# Patient Record
Sex: MALE | Employment: FULL TIME | URBAN - METROPOLITAN AREA
[De-identification: names, ages, dates, MRNs, and addresses within clinical notes are randomized per-mention and may not be internally consistent; named-entity substitution may affect disease eponyms.]

---

## 2017-09-25 ENCOUNTER — APPOINTMENT (OUTPATIENT)
Dept: GENERAL RADIOLOGY | Age: 22
End: 2017-09-25
Attending: EMERGENCY MEDICINE
Payer: SELF-PAY

## 2017-09-25 ENCOUNTER — HOSPITAL ENCOUNTER (EMERGENCY)
Age: 22
Discharge: HOME | End: 2017-09-25
Attending: EMERGENCY MEDICINE
Payer: SELF-PAY

## 2017-09-25 VITALS
TEMPERATURE: 98.3 F | DIASTOLIC BLOOD PRESSURE: 57 MMHG | WEIGHT: 175 LBS | RESPIRATION RATE: 16 BRPM | BODY MASS INDEX: 21.76 KG/M2 | OXYGEN SATURATION: 100 % | HEART RATE: 85 BPM | SYSTOLIC BLOOD PRESSURE: 119 MMHG | HEIGHT: 75 IN

## 2017-09-25 DIAGNOSIS — S52.042A CLOSED DISPLACED FRACTURE OF CORONOID PROCESS OF LEFT ULNA, INITIAL ENCOUNTER: Primary | ICD-10-CM

## 2017-09-25 PROCEDURE — L3670 SO ACRO/CLAV CAN WEB PRE OTS: HCPCS

## 2017-09-25 PROCEDURE — 73080 X-RAY EXAM OF ELBOW: CPT

## 2017-09-25 PROCEDURE — 75810000053 HC SPLINT APPLICATION

## 2017-09-25 PROCEDURE — 99283 EMERGENCY DEPT VISIT LOW MDM: CPT

## 2017-09-25 RX ORDER — TRAMADOL HYDROCHLORIDE 50 MG/1
50 TABLET ORAL
Qty: 20 TAB | Refills: 0 | Status: SHIPPED | OUTPATIENT
Start: 2017-09-25

## 2017-09-25 NOTE — DISCHARGE INSTRUCTIONS
Broken Elbow: Care Instructions  Your Care Instructions  A fractured elbow means that a bone has broken in or near the joint. Broken bones (fractures) can range from a small, hairline crack, to a bone or bones broken into two or more pieces. Your treatment depends on how bad the break is. Your doctor may have put your arm in a cast or splint to allow your elbow to heal or to keep it stable until you see another doctor. You also may wear a sling to help support your arm. It may take weeks or months for your elbow to heal. You can help it heal with some care at home. You heal best when you take good care of yourself. Eat a variety of healthy foods, and don't smoke. You may have had a sedative to help you relax. You may be unsteady after having sedation. It can take a few hours for the medicine's effects to wear off. Common side effects of sedation include nausea, vomiting, and feeling sleepy or tired. The doctor has checked you carefully, but problems can develop later. If you notice any problems or new symptoms, get medical treatment right away. Follow-up care is a key part of your treatment and safety. Be sure to make and go to all appointments, and call your doctor if you are having problems. It's also a good idea to know your test results and keep a list of the medicines you take. How can you care for yourself at home? · If the doctor gave you a sedative:  ¨ For 24 hours, don't do anything that requires attention to detail. It takes time for the medicine's effects to completely wear off. ¨ For your safety, do not drive or operate any machinery that could be dangerous. Wait until the medicine wears off and you can think clearly and react easily. · Put ice or a cold pack on your arm for 10 to 20 minutes at a time. Try to do this every 1 to 2 hours for the next 3 days (when you are awake). Put a thin cloth between the ice and your cast or splint. Keep your cast or splint dry.   · Follow the cast care instructions your doctor gives you. If you have a splint, do not take it off unless your doctor tells you to. · Be safe with medicines. Take pain medicines exactly as directed. ¨ If the doctor gave you a prescription medicine for pain, take it as prescribed. ¨ If you are not taking a prescription pain medicine, ask your doctor if you can take an over-the-counter medicine. · Prop up your arm on pillows when you sit or lie down in the first few days after the injury. Keep your elbow higher than the level of your heart. This will help reduce swelling. · Follow instructions for exercises to keep your arm strong. · Wiggle your fingers and wrist often to reduce swelling and stiffness. When should you call for help? Call 911 anytime you think you may need emergency care. For example, call if:  · You have trouble breathing. · You passed out (lost consciousness). Call your doctor now or seek immediate medical care if:  · You have new or worse nausea or vomiting. · You have increased or severe pain. · Your hand is cool or pale or changes color. · You have tingling, weakness, or numbness in your hand or fingers. · Your cast or splint feels too tight. · You cannot move your fingers or have trouble moving your fingers. · The skin under your cast or splint is burning or stinging. Watch closely for changes in your health, and be sure to contact your doctor if:  · You do not get better as expected. Where can you learn more? Go to http://montez-chad.info/. Enter E293 in the search box to learn more about \"Broken Elbow: Care Instructions. \"  Current as of: March 21, 2017  Content Version: 11.3  © 8161-8772 [employer]. Care instructions adapted under license by [employer] (which disclaims liability or warranty for this information).  If you have questions about a medical condition or this instruction, always ask your healthcare professional. Deandre Carrion disclaims any warranty or liability for your use of this information.

## 2017-09-25 NOTE — ED PROVIDER NOTES
Patient is a 19 y.o. male presenting with elbow pain. The history is provided by the patient. Elbow Pain      Vincenzo Hudson is a 19 y.o. male presents with left elbow pain. Was playing football yesterday and fell on arm. Denies previous elbow injury. History reviewed. No pertinent past medical history. Past Surgical History:   Procedure Laterality Date    HX WRIST FRACTURE TX           History reviewed. No pertinent family history. Social History     Social History    Marital status: SINGLE     Spouse name: N/A    Number of children: N/A    Years of education: N/A     Occupational History    Not on file. Social History Main Topics    Smoking status: Current Every Day Smoker     Packs/day: 0.50    Smokeless tobacco: Not on file    Alcohol use Yes      Comment: rarely    Drug use: No    Sexual activity: Not on file     Other Topics Concern    Not on file     Social History Narrative    No narrative on file         ALLERGIES: Review of patient's allergies indicates no known allergies. Review of Systems  Constitutional:  Denies malaise, fever, chills. Head:  Denies injury. Face:  Denies injury or pain. ENMT:  Denies sore throat. Neck:  Denies injury or pain. Chest:  Denies injury. Cardiac:  Denies chest pain or palpitations. Respiratory:  Denies cough, wheezing, difficulty breathing, shortness of breath. GI/ABD:  Denies injury, pain, distention, nausea, vomiting, diarrhea. :  Denies injury, pain, dysuria or urgency. Back:  Denies injury or pain. Pelvis:  Denies injury or pain. Extremity/MS:  Elbow pain  Neuro:  Denies headache, LOC, dizziness, neurologic symptoms/deficits/paresthesias. Skin: Denies injury, rash, itching or skin changes.     Vitals:    09/25/17 1015   BP: 119/57   Pulse: 85   Resp: 16   Temp: 98.3 °F (36.8 °C)   SpO2: 100%   Weight: 79.4 kg (175 lb)   Height: 6' 3\" (1.905 m)            Physical Exam   Nursing note and vitals reviewed. CONSTITUTIONAL: Alert, in no apparent distress; well-developed; well-nourished. RESP: Chest clear, equal breath sounds. CV: S1 and S2 WNL; No murmurs, gallops or rubs. UPPER EXT:  left elbow with mild swelling, good radial pulse, good cap refill. Pain with full extension. LOWER EXT: Normal inspection. NEURO: strength 5/5 and sym, sensation intact. SKIN: No rashes; Normal for age and stage. PSYCH:  Alert and oriented, normal affect. MDM  Number of Diagnoses or Management Options  Diagnosis management comments: DDX:Fracture, sprain, dislocation, contusion. IMPRESSION AND MEDICAL DECISION MAKING:  Based upon the patient's presentation with noted HPI and PE, along with the work up done in the emergency department, I believe that the patient has a fractured ulna. Will splint and have him follow up with Ortho. The patient will be discharged home. Warning signs of worsening condition were discussed and understood by the patient. Based on patient's age, coexisting illness, exam, and the results of this ED evaluation, the decision to treat as an outpatient was made. Based on the information available at time of discharge, acute pathology requiring immediate intervention was deemed relative unlikely. While it is impossible to completely exclude the possibility of underlying serious disease or worsening of condition, I feel the relative likelihood is extremely low. I discussed this uncertainty with the patient, who understood ED evaluation and treatment and felt comfortable with the outpatient treatment plan. All questions regarding care, test results, and follow up were answered. The patient is stable and appropriate to discharge. They understand that they should return to the emergency department for any new or worsening symptoms. I stressed the importance of follow up for repeat assessment and possibly further evaluation/treatment.            Amount and/or Complexity of Data Reviewed  Tests in the radiology section of CPT®: ordered and reviewed (Fractured Ulna.)  Independent visualization of images, tracings, or specimens: yes      ED Course       Procedures      Splint Check Note    Patient: Albert Oconnell  MRN: [id number]  Date: 9/25/2017  Age:  19 y.o.,      Sex: male    YOB: 1995      Type of Splint:long arm  Location: left    Applied neurovascular intact prior to splint placement neurovascular intact after splint placement splint is placed in good position. EARNESTINE Hdz September 25, 2017 10:55 AM      Vitals:  Patient Vitals for the past 12 hrs:   Temp Pulse Resp BP SpO2   09/25/17 1015 98.3 °F (36.8 °C) 85 16 119/57 100 %         Medications ordered:   Medications - No data to display      Lab findings:  No results found for this or any previous visit (from the past 12 hour(s)). EKG interpretation by ED Physician:      X-Ray, CT or other radiology findings or impressions:  XR ELBOW LT MIN 3 V   Final Result          Progress notes, Consult notes or additional Procedure notes:       Disposition:  Diagnosis:   1. Closed displaced fracture of coronoid process of left ulna, initial encounter        Disposition:   10:48 AM  Pt reevaluated at this time and is resting comfortably in NAD. Discussed results and findings, as well as, diagnosis and plan for discharge. Pt verbalizes understanding and agreement with plan. All questions addressed at this time.      Follow-up Information     Follow up With Details Comments [facility] Schedule an appointment as soon as possible for a visit in 1 day  [address], [place]  [phone]    Tony Hawley MD Schedule an appointment as soon as possible for a visit As needed Patient can only remember the practice name and not the physician      [facility]  If symptoms worsen [address] [facility]  [phone] Patient's Medications   Start Taking    TRAMADOL (ULTRAM) 50 MG TABLET    Take 1 Tab by mouth every six (6) hours as needed for Pain. Max Daily Amount: 200 mg.    Continue Taking    No medications on file   These Medications have changed    No medications on file   Stop Taking    No medications on file

## 2017-09-25 NOTE — ED NOTES
Nohemy Garcia is a 19 y.o. male that was discharged in good condition. The patients diagnosis, condition and treatment were explained to  patient and aftercare instructions were given. The patient verbalized understanding. Patient armband removed and shredded.

## 2017-09-27 ENCOUNTER — OFFICE VISIT (OUTPATIENT)
Dept: ORTHOPEDIC SURGERY | Age: 22
End: 2017-09-27

## 2017-09-27 VITALS
OXYGEN SATURATION: 100 % | SYSTOLIC BLOOD PRESSURE: 121 MMHG | WEIGHT: 164 LBS | TEMPERATURE: 97.2 F | BODY MASS INDEX: 20.39 KG/M2 | DIASTOLIC BLOOD PRESSURE: 77 MMHG | HEIGHT: 75 IN | HEART RATE: 68 BPM

## 2017-09-27 DIAGNOSIS — S52.042A CLOSED DISPLACED FRACTURE OF CORONOID PROCESS OF LEFT ULNA, INITIAL ENCOUNTER: Primary | ICD-10-CM

## 2017-09-27 DIAGNOSIS — M25.522 LEFT ELBOW PAIN: ICD-10-CM

## 2017-09-27 NOTE — LETTER
9/27/2017 8:48 AM 
 
Mr. Patricia Hsieh [address] [facility] [address] Full work restrictions for injuries to the following: 
ELBOW, FOREARM, WRIST, HAND PATIENT'S NAME: Patricia Hsieh   DATE: 9/27/2017 LIFTING:   The patient can lift no more than 10 pounds at a time CLIMBING:   The patient cannot climb ladders HAND USE:   The patient cannot participate in activities requiring      gripping, pushing or pulling. OVERHEAD WORK:  The patient can participate in activities requiring overhead     use of arms or hands on a intermittent basis only, and no     more than 10 minutes at a time. CLERICAL WORK:   The patient can participate in desk work, typing, or use of     a cash register no more than 1 hour at one time, and must     have intermittent breaks. These restrictions are in effect for the above named patient From: 9-27-17  TO:10-30-17 Sincerely, 
 
 
Ventura Hernandez MD

## 2017-09-27 NOTE — PATIENT INSTRUCTIONS
Broken Elbow: Care Instructions  Your Care Instructions  A fractured elbow means that a bone has broken in or near the joint. Broken bones (fractures) can range from a small, hairline crack, to a bone or bones broken into two or more pieces. Your treatment depends on how bad the break is. Your doctor may have put your arm in a cast or splint to allow your elbow to heal or to keep it stable until you see another doctor. You also may wear a sling to help support your arm. It may take weeks or months for your elbow to heal. You can help it heal with some care at home. You heal best when you take good care of yourself. Eat a variety of healthy foods, and don't smoke. You may have had a sedative to help you relax. You may be unsteady after having sedation. It can take a few hours for the medicine's effects to wear off. Common side effects of sedation include nausea, vomiting, and feeling sleepy or tired. The doctor has checked you carefully, but problems can develop later. If you notice any problems or new symptoms, get medical treatment right away. Follow-up care is a key part of your treatment and safety. Be sure to make and go to all appointments, and call your doctor if you are having problems. It's also a good idea to know your test results and keep a list of the medicines you take. How can you care for yourself at home? · If the doctor gave you a sedative:  ¨ For 24 hours, don't do anything that requires attention to detail. It takes time for the medicine's effects to completely wear off. ¨ For your safety, do not drive or operate any machinery that could be dangerous. Wait until the medicine wears off and you can think clearly and react easily. · Put ice or a cold pack on your arm for 10 to 20 minutes at a time. Try to do this every 1 to 2 hours for the next 3 days (when you are awake). Put a thin cloth between the ice and your cast or splint. Keep your cast or splint dry.   · Follow the cast care instructions your doctor gives you. If you have a splint, do not take it off unless your doctor tells you to. · Be safe with medicines. Take pain medicines exactly as directed. ¨ If the doctor gave you a prescription medicine for pain, take it as prescribed. ¨ If you are not taking a prescription pain medicine, ask your doctor if you can take an over-the-counter medicine. · Prop up your arm on pillows when you sit or lie down in the first few days after the injury. Keep your elbow higher than the level of your heart. This will help reduce swelling. · Follow instructions for exercises to keep your arm strong. · Wiggle your fingers and wrist often to reduce swelling and stiffness. When should you call for help? Call 911 anytime you think you may need emergency care. For example, call if:  · You have trouble breathing. · You passed out (lost consciousness). Call your doctor now or seek immediate medical care if:  · You have new or worse nausea or vomiting. · You have increased or severe pain. · Your hand is cool or pale or changes color. · You have tingling, weakness, or numbness in your hand or fingers. · Your cast or splint feels too tight. · You cannot move your fingers or have trouble moving your fingers. · The skin under your cast or splint is burning or stinging. Watch closely for changes in your health, and be sure to contact your doctor if:  · You do not get better as expected. Where can you learn more? Go to http://montez-chad.info/. Enter I455 in the search box to learn more about \"Broken Elbow: Care Instructions. \"  Current as of: March 21, 2017  Content Version: 11.3  © 3026-1811 [employer]. Care instructions adapted under license by [employer] (which disclaims liability or warranty for this information).  If you have questions about a medical condition or this instruction, always ask your healthcare professional. Zola Ormond disclaims any warranty or liability for your use of this information.

## 2017-09-27 NOTE — MR AVS SNAPSHOT
Visit Information Date & Time Provider Department Dept. Phone Encounter #  
 9/27/2017  8:15 AM Marcelino Mcginnis MD [facility] [id number] Follow-up Instructions Return in about 3 weeks (around 10/18/2017). Upcoming Health Maintenance Date Due Pneumococcal 19-64 Medium Risk (1 of 1 - PPSV23) 11/1/2012 DTaP/Tdap/Td series (1 - Tdap) 11/1/2014 INFLUENZA AGE 9 TO ADULT 8/1/2017 Allergies as of 9/27/2017  Review Complete On: 9/27/2017 By: Zafar Zayas No Known Allergies Current Immunizations  Never Reviewed No immunizations on file. Not reviewed this visit You Were Diagnosed With   
  
 Codes Comments Closed displaced fracture of coronoid process of left ulna, initial encounter    -  Primary ICD-10-CM: B80.440D ICD-9-CM: 813.02 Left elbow pain     ICD-10-CM: M59.489 ICD-9-CM: 719.42 Vitals BP Pulse Temp Height(growth percentile) Weight(growth percentile) SpO2  
 121/77 68 97.2 °F (36.2 °C) (Oral) 6' 3\" (1.905 m) 164 lb (74.4 kg) 100% BMI Smoking Status 20.5 kg/m2 Current Every Day Smoker BMI and BSA Data Body Mass Index Body Surface Area 20.5 kg/m 2 1.98 m 2 Your Updated Medication List  
  
   
This list is accurate as of: 9/27/17  8:51 AM.  Always use your most recent med list.  
  
  
  
  
 traMADol 50 mg tablet Commonly known as:  ULTRAM  
Take 1 Tab by mouth every six (6) hours as needed for Pain. Max Daily Amount: 200 mg. Follow-up Instructions Return in about 3 weeks (around 10/18/2017). Patient Instructions Broken Elbow: Care Instructions Your Care Instructions A fractured elbow means that a bone has broken in or near the joint. Broken bones (fractures) can range from a small, hairline crack, to a bone or bones broken into two or more pieces.  Your treatment depends on how bad the break is. Your doctor may have put your arm in a cast or splint to allow your elbow to heal or to keep it stable until you see another doctor. You also may wear a sling to help support your arm. It may take weeks or months for your elbow to heal. You can help it heal with some care at home. You heal best when you take good care of yourself. Eat a variety of healthy foods, and don't smoke. You may have had a sedative to help you relax. You may be unsteady after having sedation. It can take a few hours for the medicine's effects to wear off. Common side effects of sedation include nausea, vomiting, and feeling sleepy or tired. The doctor has checked you carefully, but problems can develop later. If you notice any problems or new symptoms, get medical treatment right away. Follow-up care is a key part of your treatment and safety. Be sure to make and go to all appointments, and call your doctor if you are having problems. It's also a good idea to know your test results and keep a list of the medicines you take. How can you care for yourself at home? · If the doctor gave you a sedative: ¨ For 24 hours, don't do anything that requires attention to detail. It takes time for the medicine's effects to completely wear off. ¨ For your safety, do not drive or operate any machinery that could be dangerous. Wait until the medicine wears off and you can think clearly and react easily. · Put ice or a cold pack on your arm for 10 to 20 minutes at a time. Try to do this every 1 to 2 hours for the next 3 days (when you are awake). Put a thin cloth between the ice and your cast or splint. Keep your cast or splint dry. · Follow the cast care instructions your doctor gives you. If you have a splint, do not take it off unless your doctor tells you to. · Be safe with medicines. Take pain medicines exactly as directed. ¨ If the doctor gave you a prescription medicine for pain, take it as prescribed. ¨ If you are not taking a prescription pain medicine, ask your doctor if you can take an over-the-counter medicine. · Prop up your arm on pillows when you sit or lie down in the first few days after the injury. Keep your elbow higher than the level of your heart. This will help reduce swelling. · Follow instructions for exercises to keep your arm strong. · Wiggle your fingers and wrist often to reduce swelling and stiffness. When should you call for help? Call 911 anytime you think you may need emergency care. For example, call if: 
· You have trouble breathing. · You passed out (lost consciousness). Call your doctor now or seek immediate medical care if: 
· You have new or worse nausea or vomiting. · You have increased or severe pain. · Your hand is cool or pale or changes color. · You have tingling, weakness, or numbness in your hand or fingers. · Your cast or splint feels too tight. · You cannot move your fingers or have trouble moving your fingers. · The skin under your cast or splint is burning or stinging. Watch closely for changes in your health, and be sure to contact your doctor if: 
· You do not get better as expected. Where can you learn more? Go to http://montez-chad.info/. Enter C436 in the search box to learn more about \"Broken Elbow: Care Instructions. \" Current as of: March 21, 2017 Content Version: 11.3 © 3196-2538 [employer]. Care instructions adapted under license by [employer] (which disclaims liability or warranty for this information). If you have questions about a medical condition or this instruction, always ask your healthcare professional. [address] any warranty or liability for your use of this information. Introducing [facility]!    
 Sydnee Workman introduces [employer] patient portal. Now you can access parts of your medical record, email your doctor's office, and request medication refills online. 1. In your internet browser, go to [email or web address] 2. Click on the First Time User? Click Here link in the Sign In box. You will see the New Member Sign Up page. 3. Enter your Buysight Access Code exactly as it appears below. You will not need to use this code after youve completed the sign-up process. If you do not sign up before the expiration date, you must request a new code. · Buysight Access Code: [email or web address] Expires: 12/24/2017 10:29 AM 
 
4. Enter the last four digits of your Social Security Number (xxxx) and Date of Birth (mm/dd/yyyy) as indicated and click Submit. You will be taken to the next sign-up page. 5. Create a Buysight ID. This will be your Buysight login ID and cannot be changed, so think of one that is secure and easy to remember. 6. Create a Buysight password. You can change your password at any time. 7. Enter your Password Reset Question and Answer. This can be used at a later time if you forget your password. 8. Enter your e-mail address. You will receive e-mail notification when new information is available in [address] 9. Click Sign Up. You can now view and download portions of your medical record. 10. Click the Download Summary menu link to download a portable copy of your medical information. If you have questions, please visit the Frequently Asked Questions section of the Buysight website. Remember, Buysight is NOT to be used for urgent needs. For medical emergencies, dial 911. Now available from your iPhone and Android! Please provide this summary of care documentation to your next provider. Your primary care clinician is listed as Phys Other. If you have any questions after today's visit, please call [phone].

## 2024-10-08 ENCOUNTER — HOSPITAL ENCOUNTER (OUTPATIENT)
Facility: HOSPITAL | Age: 29
Discharge: HOME | End: 2024-10-11
